# Patient Record
(demographics unavailable — no encounter records)

---

## 2024-12-03 NOTE — ASSESSMENT
[FreeTextEntry1] : 68-year-old gentleman with injury to his left shoulder after throwing out trash.  Physical examination suggest probably a degenerative SLAP tear of his shoulder.  Radiographs suggest gouty arthropathy probably resulting in at least partial-thickness rotator cuff tears.  I spent some time reviewing my working diagnosis with the patient and his wife.  We have decided on a relatively graduated conservative strategy to managing his discomfort.  Will start with physical therapy and nonsteroidal anti-inflammatory medications.  Precautions were discussed at length.  Will see him back in 6 to 8 weeks time for repeat evaluation.  If he still having pain problems we can further workup with MRI which will probably confirm the above diagnosis but will give us some further information and may recommend other strategies to try to help his pain.

## 2024-12-03 NOTE — HISTORY OF PRESENT ILLNESS
[de-identified] : C8-year-old [employer]  was throwing trash out about 2 to 3 months ago when he wrenched his left shoulder.  This is resulted in persistent pain about his left shoulder and a clicking sensation as he raises his arm at shoulder height or higher.  He has some pain at rest as well.  He has been managing the discomfort with Tylenol or over-the-counter nonsteroidal anti-inflammatory medications.  He avoids medications in general because of a surgery for cataracts on his left eye.  Has never had problems with his left shoulder before he is right-hand dominant.  Pain is localized to his shoulder without radiation.  No sensory complaints.  Past medical history: Non-insulin-dependent diabetes last A1c 6.7  Medications: Januvia Metformin  NKDA  Social: , lives with wife, works as a [employer]  no cigarette or alcohol use

## 2024-12-03 NOTE — IMAGING
[de-identified] : Middle-age gentleman sits comfortably my office in no distress.  He is accompanied by his wife in the exam room.  Physical examination: Left shoulder: Active forward flexion 160 degrees about the same as the contralateral side.  He has pain as he raises his arm from shoulder height and higher.  Mildly abnormal impingement sign Speed's Test.  He has an abnormal [place]'s test.  Normal lift off test.  Negative apprehension test.  No axilla lymph nodes.  Normal light sensation Axon nerve distribution.  No tenderness palpation along acromial arch or clavicle.  No undue swelling.  Radiographs: Left shoulder (AP, internal rotation lateral, Y scapular): Hole punch type lesions noted around the tuberosity adjacent to the insertion of the rotator cuff.  This could represent an inflammatory arthritis such as gout.  There is also evidence of narrowing of the glenohumeral joint space in the inferior aspect of the joint with a small marginal osteophyte.